# Patient Record
Sex: MALE | Race: OTHER | HISPANIC OR LATINO
[De-identification: names, ages, dates, MRNs, and addresses within clinical notes are randomized per-mention and may not be internally consistent; named-entity substitution may affect disease eponyms.]

---

## 2022-12-25 ENCOUNTER — RESULT REVIEW (OUTPATIENT)
Age: 47
End: 2022-12-25

## 2022-12-26 ENCOUNTER — RESULT REVIEW (OUTPATIENT)
Age: 47
End: 2022-12-26

## 2022-12-27 ENCOUNTER — TRANSCRIPTION ENCOUNTER (OUTPATIENT)
Age: 47
End: 2022-12-27

## 2022-12-28 ENCOUNTER — TRANSCRIPTION ENCOUNTER (OUTPATIENT)
Age: 47
End: 2022-12-28

## 2023-01-03 PROBLEM — Z00.00 ENCOUNTER FOR PREVENTIVE HEALTH EXAMINATION: Status: ACTIVE | Noted: 2023-01-03

## 2023-01-09 ENCOUNTER — NON-APPOINTMENT (OUTPATIENT)
Age: 48
End: 2023-01-09

## 2023-01-09 ENCOUNTER — APPOINTMENT (OUTPATIENT)
Dept: SURGERY | Facility: CLINIC | Age: 48
End: 2023-01-09
Payer: SELF-PAY

## 2023-01-09 VITALS
DIASTOLIC BLOOD PRESSURE: 74 MMHG | HEIGHT: 69 IN | SYSTOLIC BLOOD PRESSURE: 114 MMHG | HEART RATE: 69 BPM | BODY MASS INDEX: 29.77 KG/M2 | OXYGEN SATURATION: 96 % | WEIGHT: 201 LBS | TEMPERATURE: 98.7 F

## 2023-01-09 DIAGNOSIS — Z82.49 FAMILY HISTORY OF ISCHEMIC HEART DISEASE AND OTHER DISEASES OF THE CIRCULATORY SYSTEM: ICD-10-CM

## 2023-01-09 DIAGNOSIS — Z78.9 OTHER SPECIFIED HEALTH STATUS: ICD-10-CM

## 2023-01-09 DIAGNOSIS — Z77.22 CONTACT WITH AND (SUSPECTED) EXPOSURE TO ENVIRONMENTAL TOBACCO SMOKE (ACUTE) (CHRONIC): ICD-10-CM

## 2023-01-09 DIAGNOSIS — Z83.3 FAMILY HISTORY OF DIABETES MELLITUS: ICD-10-CM

## 2023-01-09 DIAGNOSIS — K80.12 CALCULUS OF GALLBLADDER WITH ACUTE AND CHRONIC CHOLECYSTITIS W/OUT OBSTRUCTION: ICD-10-CM

## 2023-01-09 PROCEDURE — 99024 POSTOP FOLLOW-UP VISIT: CPT

## 2023-01-09 NOTE — PLAN
[FreeTextEntry1] : Patient is 2-week status post cholecystectomy.  Pathology confirms cholecystitis.  Patient has no complaints.  On exam his incisions are well-healed.  He is eating going to the bathroom.\par \par Plan: The patient avoid heavy lifting for 2 additional weeks.  He will follow me on as-needed basis.